# Patient Record
Sex: MALE | Race: WHITE | NOT HISPANIC OR LATINO | Employment: OTHER | URBAN - METROPOLITAN AREA
[De-identification: names, ages, dates, MRNs, and addresses within clinical notes are randomized per-mention and may not be internally consistent; named-entity substitution may affect disease eponyms.]

---

## 2017-03-31 ENCOUNTER — HOSPITAL ENCOUNTER (EMERGENCY)
Facility: CLINIC | Age: 77
Discharge: HOME | End: 2017-04-01
Attending: EMERGENCY MEDICINE | Admitting: EMERGENCY MEDICINE
Payer: MEDICARE

## 2017-03-31 DIAGNOSIS — K52.9: ICD-10-CM

## 2017-03-31 DIAGNOSIS — E87.6 HYPOPOTASSEMIA: ICD-10-CM

## 2017-03-31 PROCEDURE — 96361 HYDRATE IV INFUSION ADD-ON: CPT | Performed by: EMERGENCY MEDICINE

## 2017-03-31 PROCEDURE — 96360 HYDRATION IV INFUSION INIT: CPT | Performed by: EMERGENCY MEDICINE

## 2017-03-31 PROCEDURE — 99285 EMERGENCY DEPT VISIT HI MDM: CPT | Mod: 25 | Performed by: EMERGENCY MEDICINE

## 2017-03-31 PROCEDURE — 99284 EMERGENCY DEPT VISIT MOD MDM: CPT | Mod: Z6 | Performed by: EMERGENCY MEDICINE

## 2017-03-31 NOTE — ED AVS SNAPSHOT
" [facility], Emergency Department    [address] [facility] [address] [address]    Phone:  [phone]                                       Gregory Parnell   MRN: [id number]    Department:  [facility], Emergency Department   Date of Visit:  3/31/2017           Patient Information     Date Of Birth          1940        Your diagnoses for this visit were:     Enteritis, infectious, presumed        You were seen by Sampson Cruz MD.        Discharge Instructions       -Please make an appointment to follow up with Your Primary Care Provider in 2-3 days if not improving.  -May take imodium over the counter for diarrhea if persistent and more severe.  -Recommend monitoring for any return of or worsening pain, fevers, or nausea/vomiting that are uncontrolled and unable to keep any fluids down. If you are restarting oral intake back to normal diet recommend small amounts of food with increases to normal as tolerated.       * FOOD POISONING or VIRAL GASTROENTERITIS (6yr-Adult)  FOOD POISONING may occur from 6 to 24 hours after eating food that has spoiled and lasts up to1-2 days. VIRAL GASTRO-ENTERITIS is commonly known as the \"stomach flu\" and may last 2-7 days. Symptoms of both illnesses may include vomiting, diarrhea, fever, abdominal cramping. Antibiotics are not effective, but simple home treatment will be helpful.  HOME CARE:    If symptoms are severe, rest at home for the next 24 hours.    Avoid tobacco and alcohol. These may worsen your symptoms.    If medicines for diarrhea (low dose of Immodium: one tablet a day for an adult) or vomiting were prescribed, take only as directed.   During the first 12 to 24 hours follow the diet below:    DRINKS: Sport drinks like Gatorade, soft drinks without caffeine; ginger ale, mineral water (plain or flavored), decaffeinated tea and coffee.    SOUPS: Clear broth, consommé and bouillon    DESSERTS: Plain gelatin (Jell-O), popsicles and fruit juice " bars.  During the next 24 hours you may add the following to the above:    Hot cereal, plain toast, bread, rolls, crackers    Plain noodles, rice, mashed potatoes, chicken noodle or rice soup    Unsweetened canned fruit (avoid pineapple), bananas    Limit fat intake to less than 15 grams per day by avoiding margarine, butter, oils, mayonnaise, sauces, gravies, fried foods, peanut butter, meat, poultry and fish.    Limit fiber; avoid raw or cooked vegetables, fresh fruits (except bananas) and bran cereals.    Limit caffeine and chocolate. No spices or seasonings except salt.  Slowly go back to a normal diet as you feel better and your symptoms lessen.  FOLLOW UP with your doctor as advised if you are not better in 2 days. If a stool (diarrhea) sample was taken, you may call in 2 days (or as directed) for the results.  GET PROMPT MEDICAL ATTENTION if any of the following occur:    Increasing abdominal pain or constant pain in one spot    Continued vomiting (unable to keep liquids down)    Frequent diarrhea (more than 5 times a day)    Blood in vomit or stool (black or red color)    Unable to take in fluids at all    No urine output for 12 hours or extreme thirst    Weakness, dizziness, fainting    Drowsiness, confusion, stiff neck or seizure    Fever over 101.0  F (38.3  C) for more than 3 days    [employer] laura    8134-9364 [place] [address]. All rights reserved. This information is not intended as a substitute for professional medical care. Always follow your healthcare professional's instructions.      Treating Diarrhea  Diarrhea occurs when you have loose, watery, or frequent bowel movements. It is a common problem with many causes. Most cases of diarrhea clear up on their own. But certain cases may need treatment. Be sure to see your health care provider if your symptoms do not improve within a few days.    Getting Relief  Treatment of diarrhea depends on its cause. Diarrhea  caused by bacterial or parasite infection is often treated with antibiotics. Diarrhea caused by other factors, such as a stomach virus, often improves with simple home treatment. The tips below may also help relieve your symptoms.    Drink plenty of fluids. This helps prevent too much fluid loss (dehydration). Water, clear soups, and electrolyte solutions are good choices. Avoid alcohol, coffee, tea, and milk. These can make symptoms worse.    Suck on ice chips if drinking makes you queasy.    Return to your normal diet slowly. You may want to eat bland foods at first, such as rice and toast. Also, you may need to avoid certain foods for a while, such as dairy products. These can make symptoms worse. Ask your health care provider if there are any other foods you should avoid.    If you were prescribed antibiotics, take them as directed.    Do not take anti-diarrhea medications without asking your health care provider first.  Call Your Health Care Provider If You Have:    Fever of 102 F (38.0 C) or higher    Severe pain    Worsening diarrhea or diarrhea for more than 2 days    Bloody vomit or stool    Signs of dehydration (dizziness, dry mouth and tongue, rapid pulse, dark urine)    4644-8416 [employer]. [address] [place] [address]. All rights reserved. This information is not intended as a substitute for professional medical care. Always follow your healthcare professional's instructions.          24 Hour Appointment Hotline       To make an appointment at any [facility], call [phone] ([phone]). If you don't have a family doctor or clinic, we will help you find one. [place] clinics are conveniently located to serve the needs of you and your family.             Review of your medicines      START taking        Dose / Directions Last dose taken    ondansetron 4 MG ODT tab   Commonly known as:  ZOFRAN ODT   Dose:  4 mg   Quantity:  10 tablet        Take 1 tablet (4 mg)  by mouth every 8 hours as needed for nausea   Refills:  0                Prescriptions were sent or printed at these locations (1 Prescription)                   Other Prescriptions                Printed at Department/Unit printer (1 of 1)         ondansetron (ZOFRAN ODT) 4 MG ODT tab                Procedures and tests performed during your visit     CBC with platelets differential    CT Abdomen Pelvis w Contrast    Clostridium difficile toxin B PCR    Comprehensive metabolic panel    Enteric Bacteria and Virus Panel by DEVORAH Stool    Lipase    Occult blood stool    Peripheral IV: Standard    Pulse oximetry nursing      Orders Needing Specimen Collection     Ordered          04/01/17 0032  UA with Microscopic - STAT, [facility]: STAT, Needs to be Collected     Scheduled Task Status   04/01/17 0033 Collect UA with Microscopic Open   Order Class:  PCU Collect                  Pending Results     Date and Time Order Name Status Description    4/1/2017 0128 CT Abdomen Pelvis w Contrast Preliminary     4/1/2017 0032 Clostridium difficile toxin B PCR In process     4/1/2017 0032 Enteric Bacteria and Virus Panel by DEVORAH Stool In process     4/1/2017 0032 Occult blood stool In process             Pending Culture Results     Date and Time Order Name Status Description    4/1/2017 0032 Clostridium difficile toxin B PCR In process     4/1/2017 0032 Enteric Bacteria and Virus Panel by DEVORAH Stool In process             Thank you for choosing [facility]       Thank you for choosing [facility] for your care. Our goal is always to provide you with excellent care. Hearing back from our patients is one way we can continue to improve our services. Please take a few minutes to complete the written survey that you may receive in the mail after you visit with us. Thank you!        [employer] Information     [employer] lets you send messages to your doctor, view your test results, renew your prescriptions, schedule appointments and more. To sign up, go to  "[email or web address] . Click on \"Log in\" on the left side of the screen, which will take you to the Welcome page. Then click on \"Sign up Now\" on the right side of the page.     You will be asked to enter the access code listed below, as well as some personal information. Please follow the directions to create your username and password.     Your access code is: [email or web address]  Expires: 2017  4:14 AM     Your access code will  in 90 days. If you need help or a new code, please call your [facility] clinic or [phone].        Care EveryWhere ID     This is your Care EveryWhere ID. This could be used by other organizations to access your [facility] medical records  [id number]        After Visit Summary       This is your record. Keep this with you and show to your community pharmacist(s) and doctor(s) at your next visit.                  "

## 2017-03-31 NOTE — ED AVS SNAPSHOT
[facility], [place], Emergency Department    [address] [place] [address]    Phone:  [phone]                                       Gregory Parnell   MRN: [id number]    Department:  [facility], Emergency Department   Date of Visit:  3/31/2017           After Visit Summary Signature Page     I have received my discharge instructions, and my questions have been answered. I have discussed any challenges I see with this plan with the nurse or doctor.    ..........................................................................................................................................  Patient/Patient Representative Signature      ..........................................................................................................................................  Patient Representative Print Name and Relationship to Patient    ..................................................               ................................................  Date                                            Time    ..........................................................................................................................................  Reviewed by Signature/Title    ...................................................              ..............................................  Date                                                            Time

## 2017-04-01 ENCOUNTER — APPOINTMENT (OUTPATIENT)
Dept: CT IMAGING | Facility: CLINIC | Age: 77
End: 2017-04-01
Attending: EMERGENCY MEDICINE
Payer: MEDICARE

## 2017-04-01 ENCOUNTER — TELEPHONE (OUTPATIENT)
Dept: EMERGENCY MEDICINE | Facility: CLINIC | Age: 77
End: 2017-04-01

## 2017-04-01 VITALS
WEIGHT: 195 LBS | HEIGHT: 67 IN | OXYGEN SATURATION: 95 % | DIASTOLIC BLOOD PRESSURE: 80 MMHG | BODY MASS INDEX: 30.61 KG/M2 | HEART RATE: 56 BPM | RESPIRATION RATE: 18 BRPM | TEMPERATURE: 97.3 F | SYSTOLIC BLOOD PRESSURE: 126 MMHG

## 2017-04-01 LAB
ALBUMIN SERPL-MCNC: 3.2 G/DL (ref 3.4–5)
ALP SERPL-CCNC: 64 U/L (ref 40–150)
ALT SERPL W P-5'-P-CCNC: 15 U/L (ref 0–70)
ANION GAP SERPL CALCULATED.3IONS-SCNC: 12 MMOL/L (ref 3–14)
AST SERPL W P-5'-P-CCNC: 13 U/L (ref 0–45)
BASOPHILS # BLD AUTO: 0 10E9/L (ref 0–0.2)
BASOPHILS NFR BLD AUTO: 0.1 %
BILIRUB SERPL-MCNC: 0.5 MG/DL (ref 0.2–1.3)
BUN SERPL-MCNC: 21 MG/DL (ref 7–30)
C DIFF TOX B STL QL: NORMAL
CALCIUM SERPL-MCNC: 8.3 MG/DL (ref 8.5–10.1)
CAMPYLOBACTER GROUP BY NAT: ABNORMAL
CHLORIDE SERPL-SCNC: 108 MMOL/L (ref 94–109)
CO2 SERPL-SCNC: 24 MMOL/L (ref 20–32)
CREAT SERPL-MCNC: 0.77 MG/DL (ref 0.66–1.25)
DIFFERENTIAL METHOD BLD: ABNORMAL
ENTERIC PATHOGEN COMMENT: ABNORMAL
EOSINOPHIL # BLD AUTO: 0 10E9/L (ref 0–0.7)
EOSINOPHIL NFR BLD AUTO: 0.1 %
ERYTHROCYTE [DISTWIDTH] IN BLOOD BY AUTOMATED COUNT: 12.8 % (ref 10–15)
GFR SERPL CREATININE-BSD FRML MDRD: ABNORMAL ML/MIN/1.7M2
GLUCOSE SERPL-MCNC: 122 MG/DL (ref 70–99)
HCT VFR BLD AUTO: 42.9 % (ref 40–53)
HEMOCCULT STL QL: POSITIVE
HGB BLD-MCNC: 14.5 G/DL (ref 13.3–17.7)
IMM GRANULOCYTES # BLD: 0 10E9/L (ref 0–0.4)
IMM GRANULOCYTES NFR BLD: 0.1 %
LIPASE SERPL-CCNC: 53 U/L (ref 73–393)
LYMPHOCYTES # BLD AUTO: 0.7 10E9/L (ref 0.8–5.3)
LYMPHOCYTES NFR BLD AUTO: 9.8 %
MCH RBC QN AUTO: 33 PG (ref 26.5–33)
MCHC RBC AUTO-ENTMCNC: 33.8 G/DL (ref 31.5–36.5)
MCV RBC AUTO: 98 FL (ref 78–100)
MONOCYTES # BLD AUTO: 0.5 10E9/L (ref 0–1.3)
MONOCYTES NFR BLD AUTO: 6.8 %
NEUTROPHILS # BLD AUTO: 5.8 10E9/L (ref 1.6–8.3)
NEUTROPHILS NFR BLD AUTO: 83.1 %
NOROVIRUS I AND II BY NAT: ABNORMAL
NRBC # BLD AUTO: 0 10*3/UL
NRBC BLD AUTO-RTO: 0 /100
PLATELET # BLD AUTO: 148 10E9/L (ref 150–450)
POTASSIUM SERPL-SCNC: 3 MMOL/L (ref 3.4–5.3)
PROT SERPL-MCNC: 6.6 G/DL (ref 6.8–8.8)
RBC # BLD AUTO: 4.4 10E12/L (ref 4.4–5.9)
ROTAVIRUS A BY NAT: ABNORMAL
SALMONELLA SPECIES BY NAT: ABNORMAL
SHIGA TOXIN 1 GENE BY NAT: ABNORMAL
SHIGA TOXIN 2 GENE BY NAT: ABNORMAL
SHIGELLA SP+EIEC IPAH STL QL NAA+PROBE: ABNORMAL
SODIUM SERPL-SCNC: 143 MMOL/L (ref 133–144)
SPECIMEN SOURCE: NORMAL
VIBRIO GROUP BY NAT: ABNORMAL
WBC # BLD AUTO: 6.9 10E9/L (ref 4–11)
YERSINIA ENTEROCOLITICA BY NAT: ABNORMAL

## 2017-04-01 PROCEDURE — 83690 ASSAY OF LIPASE: CPT | Performed by: EMERGENCY MEDICINE

## 2017-04-01 PROCEDURE — 96360 HYDRATION IV INFUSION INIT: CPT | Performed by: EMERGENCY MEDICINE

## 2017-04-01 PROCEDURE — 96361 HYDRATE IV INFUSION ADD-ON: CPT | Performed by: EMERGENCY MEDICINE

## 2017-04-01 PROCEDURE — 82272 OCCULT BLD FECES 1-3 TESTS: CPT | Performed by: EMERGENCY MEDICINE

## 2017-04-01 PROCEDURE — 80053 COMPREHEN METABOLIC PANEL: CPT | Performed by: EMERGENCY MEDICINE

## 2017-04-01 PROCEDURE — 25500064 ZZH RX 255 OP 636: Performed by: EMERGENCY MEDICINE

## 2017-04-01 PROCEDURE — A9270 NON-COVERED ITEM OR SERVICE: HCPCS | Mod: GY | Performed by: EMERGENCY MEDICINE

## 2017-04-01 PROCEDURE — 87493 C DIFF AMPLIFIED PROBE: CPT | Performed by: EMERGENCY MEDICINE

## 2017-04-01 PROCEDURE — 25000128 H RX IP 250 OP 636: Performed by: EMERGENCY MEDICINE

## 2017-04-01 PROCEDURE — 74177 CT ABD & PELVIS W/CONTRAST: CPT

## 2017-04-01 PROCEDURE — 85025 COMPLETE CBC W/AUTO DIFF WBC: CPT | Performed by: EMERGENCY MEDICINE

## 2017-04-01 PROCEDURE — 25000132 ZZH RX MED GY IP 250 OP 250 PS 637: Mod: GY | Performed by: EMERGENCY MEDICINE

## 2017-04-01 RX ORDER — LIDOCAINE 40 MG/G
CREAM TOPICAL
Status: DISCONTINUED | OUTPATIENT
Start: 2017-04-01 | End: 2017-04-01 | Stop reason: HOSPADM

## 2017-04-01 RX ORDER — POTASSIUM CHLORIDE 750 MG/1
40 TABLET, EXTENDED RELEASE ORAL ONCE
Status: COMPLETED | OUTPATIENT
Start: 2017-04-01 | End: 2017-04-01

## 2017-04-01 RX ORDER — IOPAMIDOL 755 MG/ML
119 INJECTION, SOLUTION INTRAVASCULAR ONCE
Status: COMPLETED | OUTPATIENT
Start: 2017-04-01 | End: 2017-04-01

## 2017-04-01 RX ORDER — SODIUM CHLORIDE 9 MG/ML
1000 INJECTION, SOLUTION INTRAVENOUS CONTINUOUS
Status: DISCONTINUED | OUTPATIENT
Start: 2017-04-01 | End: 2017-04-01 | Stop reason: HOSPADM

## 2017-04-01 RX ORDER — ONDANSETRON 4 MG/1
4 TABLET, ORALLY DISINTEGRATING ORAL EVERY 8 HOURS PRN
Qty: 10 TABLET | Refills: 0 | Status: SHIPPED | OUTPATIENT
Start: 2017-04-01 | End: 2017-04-04

## 2017-04-01 RX ADMIN — POTASSIUM CHLORIDE 40 MEQ: 750 TABLET, EXTENDED RELEASE ORAL at 03:14

## 2017-04-01 RX ADMIN — IOPAMIDOL 119 ML: 755 INJECTION, SOLUTION INTRAVENOUS at 03:09

## 2017-04-01 RX ADMIN — SODIUM CHLORIDE 1000 ML: 9 INJECTION, SOLUTION INTRAVENOUS at 00:38

## 2017-04-01 RX ADMIN — SODIUM CHLORIDE 1000 ML: 9 INJECTION, SOLUTION INTRAVENOUS at 01:45

## 2017-04-01 ASSESSMENT — ENCOUNTER SYMPTOMS
DIARRHEA: 1
NAUSEA: 0
ABDOMINAL PAIN: 1

## 2017-04-01 NOTE — ED PROVIDER NOTES
"  History     Chief Complaint   Patient presents with     Abdominal Pain     Diarrhea     HPI  Gregory Parnell is a 76 year old male who presents to the ED with complaints of diffuse epigastric abdominal pain and diarrhea. Patient reports symptoms began yesterday morning. He states his wife had just gotten over the \"stomach flu\". He rates his diffuse epigastric abdominal pain 4-5 on a scale of 10. He denies current nausea. Patient reports he had an E.coli infection in his blood in 09/2016 with no stomach pain or diarrhea at that time.     PAST MEDICAL HISTORY  No past medical history on file.  PAST SURGICAL HISTORY  No past surgical history on file.  FAMILY HISTORY  No family history on file.  SOCIAL HISTORY  Social History   Substance Use Topics     Smoking status: Not on file     Smokeless tobacco: Not on file     Alcohol use Not on file     MEDICATIONS  No current facility-administered medications for this encounter.      Current Outpatient Prescriptions   Medication     ondansetron (ZOFRAN ODT) 4 MG ODT tab     ALLERGIES  No Known Allergies    I have reviewed the Medications, Allergies, Past Medical and Surgical History, and Social History in the Epic system.    Review of Systems   Gastrointestinal: Positive for abdominal pain (epigastric) and diarrhea. Negative for nausea.   All other systems reviewed and are negative.    Physical Exam   BP: 124/67  Pulse: 69  Temp: 97.3  F (36.3  C)  Height: 170.2 cm (5' 7\")  Weight: 88.5 kg (195 lb)  SpO2: 97 %  Physical Exam   Constitutional: He appears well-developed and well-nourished. No distress.   HENT:   Head: Normocephalic and atraumatic.   Cardiovascular: Normal rate, regular rhythm and normal heart sounds.    Pulmonary/Chest: Effort normal and breath sounds normal. No respiratory distress. He has no wheezes.   Abdominal: Soft. Bowel sounds are normal. He exhibits no distension. There is no tenderness. There is no guarding.   Musculoskeletal: Normal range of motion. " He exhibits no edema.   Neurological: He is alert.   Skin: Skin is warm and dry. He is not diaphoretic. No pallor.   Psychiatric: He has a normal mood and affect.   Nursing note and vitals reviewed.    ED Course     ED Course     Procedures   12:21 AM  The patient was seen and examined by Dr. Cruz in Room 18.           Critical Care time:  none        Labs Ordered and Resulted from Time of ED Arrival Up to the Time of Departure from the ED   CBC WITH PLATELETS DIFFERENTIAL - Abnormal; Notable for the following:        Result Value    Platelet Count 148 (*)     Absolute Lymphocytes 0.7 (*)     All other components within normal limits   COMPREHENSIVE METABOLIC PANEL - Abnormal; Notable for the following:     Potassium 3.0 (*)     Glucose 122 (*)     Calcium 8.3 (*)     Albumin 3.2 (*)     Protein Total 6.6 (*)     All other components within normal limits   LIPASE - Abnormal; Notable for the following:     Lipase 53 (*)     All other components within normal limits   PULSE OXIMETRY NURSING   PERIPHERAL IV CATHETER   ENTERIC BACTERIA AND VIRUS PANEL BY DEVORAH STOOL   CLOSTRIDIUM DIFFICILE TOXIN B     Ct Abdomen Pelvis W Contrast    Result Date: 4/1/2017  EXAMINATION: CT ABDOMEN PELVIS W CONTRAST, 4/1/2017 3:25 AM TECHNIQUE:  Helical CT images from the lung bases through the symphysis pubis were obtained with IV contrast. Contrast dose: iopamidol (ISOVUE-370) solution 119 mL   COMPARISON: None HISTORY: eval for trans colon diverticulitis or colitis. FINDINGS: Lower chest: Slightly prominent mediastinal and left hilar lymph nodes are not enlarged by size criteria. Dependent atelectasis. 2 -3 mm nodular opacities in the right minor fissure, likely fissural lymph nodes. Bones and soft tissues: Old right lower rib fractures. Degenerative changes lumbar spine. Abdomen and pelvis: Colonic diverticulosis. No diverticulitis. Normal liver, gallbladder, spleen, pancreas, kidneys, urinary bladder. Mild thickening of bilateral  adrenal glands, which may be due to hyperplasia or adenomatous change. Prostate radiotherapy marker clips. Colonic diverticulosis, no diverticulitis. Appendix not reliably seen. No pericecal fat stranding. Dilatation of mid and distal small bowel loops with diameter up to 4.1 cm. No transition point. Slightly increased enhancement of the distal small bowel walls containing fluid. Minimal mesenteric fluid. Few peripancreatic and periportal lymph nodes largest in portal caval location on series 2 image 63 with short axis of 12 mm which is within normal limits for this location. Periampullary diverticulum. Fluid noted within the large bowel.     IMPRESSION:  Mild dilatation of the mid small bowel loops, with fluid distended distal small bowel loops, with fluid noted within the large bowel. Mild hyperenhancement of the distal small bowel wall. Findings are most consistent with enteritis, unlikely to be small bowel obstruction. No focal transverse colon diverticulitis or colitis as clinically questioned. I have personally reviewed the examination and initial interpretation and I agree with the findings. SUSI AGOSTO    Assessments & Plan (with Medical Decision Making)   I was physically present and have reviewed and verified the accuracy of this note documented by (myself).     Disclaimer: This note consists of symbols derived from keyboarding, dictation, and/or voice recognition software. As a result, there may be errors in the script that have gone undetected.  Please consider this when interpreting information found in the chart.These sections of the chart were reviewed for accuracy to the best of my knowledge and ability.    Patient was clinically assessed and consented to treatment. After assessment, medical decision making and workup were discussed with the patient. The patient was agreeable to plan for testing, workup, and treatment.  Gregory Parnell is a 76 year old male who presents today for diarrhea with  abdominal discomfort.  Patient without any abdominal tenderness on exam however with the diarrhea patient could be concerning for colitis, diverticulitis, enteritis, gastroparesis, gastritis, pancreatitis.  IV was established and labs were sent.  Stool cultures were also sent.  Patient did not have leukocytosis and hemoglobin was stable.  Comprehensive metabolic panel showed slightly low potassium which will be repleted.  No findings of any biliary elevation or disease.  Lipase was 53 and unlikely pancreatitis.  Given the patient's reported tenderness in the abdomen he will be sent for CT scan to evaluate for diverticulitis or colitis.  CT scan showed dilation of small bowel loops which could be consistent with enteritis more so than obstruction.  He did have air going all the way through the rectum and I agree this is likely enteritis as opposed to small bowel obstruction especially in light of fact the patient has had 3 diarrheal bowel movements since arrival in the ER.  These were sent for culture which are pending at this time and patient was feeling well after some IV fluids and Zofran.  She will be discharged home with supportive care instructions and recommendations to follow-up with his cultures to probably have to start antibiotics or he will be contacted from the ER.  Patient was called with this plan and will be discharged home at this time.    I have reviewed the nursing notes.    I have reviewed the findings, diagnosis, plan and need for follow up with the patient.    Discharge Medication List as of 4/1/2017  4:14 AM      START taking these medications    Details   ondansetron (ZOFRAN ODT) 4 MG ODT tab Take 1 tablet (4 mg) by mouth every 8 hours as needed for nausea, Disp-10 tablet, R-0, Local Print             Final diagnoses:   Enteritis, infectious, presumed     I, Henry Ortega am serving as a trained medical scribe to document services personally performed by Sampson Cruz MD, based on the  provider's statements to me.      I, Sampson Cruz MD, was physically present and have reviewed and verified the accuracy of this note documented by Henry Ortega.      3/31/2017   [facility], [place], EMERGENCY DEPARTMENT     Sampson Cruz MD  04/01/17 0844

## 2017-04-01 NOTE — DISCHARGE INSTRUCTIONS
"-Please make an appointment to follow up with Your Primary Care Provider in 2-3 days if not improving.  -May take imodium over the counter for diarrhea if persistent and more severe.  -Recommend monitoring for any return of or worsening pain, fevers, or nausea/vomiting that are uncontrolled and unable to keep any fluids down. If you are restarting oral intake back to normal diet recommend small amounts of food with increases to normal as tolerated.       * FOOD POISONING or VIRAL GASTROENTERITIS (6yr-Adult)  FOOD POISONING may occur from 6 to 24 hours after eating food that has spoiled and lasts up to1-2 days. VIRAL GASTRO-ENTERITIS is commonly known as the \"stomach flu\" and may last 2-7 days. Symptoms of both illnesses may include vomiting, diarrhea, fever, abdominal cramping. Antibiotics are not effective, but simple home treatment will be helpful.  HOME CARE:    If symptoms are severe, rest at home for the next 24 hours.    Avoid tobacco and alcohol. These may worsen your symptoms.    If medicines for diarrhea (low dose of Immodium: one tablet a day for an adult) or vomiting were prescribed, take only as directed.   During the first 12 to 24 hours follow the diet below:    DRINKS: Sport drinks like Gatorade, soft drinks without caffeine; ginger ale, mineral water (plain or flavored), decaffeinated tea and coffee.    SOUPS: Clear broth, consommé and bouillon    DESSERTS: Plain gelatin (Jell-O), popsicles and fruit juice bars.  During the next 24 hours you may add the following to the above:    Hot cereal, plain toast, bread, rolls, crackers    Plain noodles, rice, mashed potatoes, chicken noodle or rice soup    Unsweetened canned fruit (avoid pineapple), bananas    Limit fat intake to less than 15 grams per day by avoiding margarine, butter, oils, mayonnaise, sauces, gravies, fried foods, peanut butter, meat, poultry and fish.    Limit fiber; avoid raw or cooked vegetables, fresh fruits (except bananas) and bran " cereals.    Limit caffeine and chocolate. No spices or seasonings except salt.  Slowly go back to a normal diet as you feel better and your symptoms lessen.  FOLLOW UP with your doctor as advised if you are not better in 2 days. If a stool (diarrhea) sample was taken, you may call in 2 days (or as directed) for the results.  GET PROMPT MEDICAL ATTENTION if any of the following occur:    Increasing abdominal pain or constant pain in one spot    Continued vomiting (unable to keep liquids down)    Frequent diarrhea (more than 5 times a day)    Blood in vomit or stool (black or red color)    Unable to take in fluids at all    No urine output for 12 hours or extreme thirst    Weakness, dizziness, fainting    Drowsiness, confusion, stiff neck or seizure    Fever over 101.0  F (38.3  C) for more than 3 days    New rash    1982-6929 [employer] [facility], [address] [place] [address]. All rights reserved. This information is not intended as a substitute for professional medical care. Always follow your healthcare professional's instructions.      Treating Diarrhea  Diarrhea occurs when you have loose, watery, or frequent bowel movements. It is a common problem with many causes. Most cases of diarrhea clear up on their own. But certain cases may need treatment. Be sure to see your health care provider if your symptoms do not improve within a few days.    Getting Relief  Treatment of diarrhea depends on its cause. Diarrhea caused by bacterial or parasite infection is often treated with antibiotics. Diarrhea caused by other factors, such as a stomach virus, often improves with simple home treatment. The tips below may also help relieve your symptoms.    Drink plenty of fluids. This helps prevent too much fluid loss (dehydration). Water, clear soups, and electrolyte solutions are good choices. Avoid alcohol, coffee, tea, and milk. These can make symptoms worse.    Suck on ice chips if drinking makes you queasy.    Return  to your normal diet slowly. You may want to eat bland foods at first, such as rice and toast. Also, you may need to avoid certain foods for a while, such as dairy products. These can make symptoms worse. Ask your health care provider if there are any other foods you should avoid.    If you were prescribed antibiotics, take them as directed.    Do not take anti-diarrhea medications without asking your health care provider first.  Call Your Health Care Provider If You Have:    Fever of 102 F (38.0 C) or higher    Severe pain    Worsening diarrhea or diarrhea for more than 2 days    Bloody vomit or stool    Signs of dehydration (dizziness, dry mouth and tongue, rapid pulse, dark urine)    3171-6378 [employer] [address] [place] [address], [place] [address]. All rights reserved. This information is not intended as a substitute for professional medical care. Always follow your healthcare professional's instructions.

## 2017-04-01 NOTE — TELEPHONE ENCOUNTER
[facility] Emergency Department Lab result notification:    Reason for call  Notify of lab results, assess symptoms,  review ED providers recommendations (if necessary) and advise per ED lab result f/u protocol.    Lab result  C diff negative / Enteric bacteria and virus panel specimen not properly collected, unable to run test.  Advised if symptoms continue, to be seen again with PCP or in a different ED.  Currently feeling much improved and is driving back home to [place].  Wife verbalizes understanding.       Shadia Lyle RN    [facility] RN  Lung Nodule and ED Lab Results F/U RN  Epic pool (ED late result f/u RN) : [id number]   # [phone]

## 2017-04-01 NOTE — ED NOTES
"Patient arrived to the ED with persistent stomach pain. He is just getting over the \"stomach flu\" and has a history of e.coli infection. He is alert, oriented, and ambulatory.   "

## 2023-11-28 ENCOUNTER — TRANSFERRED RECORDS (OUTPATIENT)
Dept: HEALTH INFORMATION MANAGEMENT | Facility: CLINIC | Age: 83
End: 2023-11-28
Payer: MEDICARE

## 2023-11-29 ENCOUNTER — TRANSCRIBE ORDERS (OUTPATIENT)
Dept: OTHER | Age: 83
End: 2023-11-29

## 2023-11-29 DIAGNOSIS — H02.88B MEIBOMIAN GLAND DYSFUNCTION LEFT EYE, UPPER AND LOWER EYELIDS: ICD-10-CM

## 2023-11-29 DIAGNOSIS — H16.222 KERATOCONJUNCTIVITIS SICCA, NOT SPECIFIED AS SJOGREN'S, LEFT EYE: ICD-10-CM

## 2023-11-29 DIAGNOSIS — H02.423 MYOGENIC PTOSIS OF BILATERAL EYELIDS: Primary | ICD-10-CM

## 2023-11-29 DIAGNOSIS — H02.88A MEIBOMIAN GLAND DYSFUNCTION RIGHT EYE, UPPER AND LOWER EYELIDS: ICD-10-CM

## 2023-11-29 NOTE — TELEPHONE ENCOUNTER
FUTURE VISIT INFORMATION      FUTURE VISIT INFORMATION:  Date: 2/26/2024  Time: 8 AM  Location: [facility]  REFERRAL INFORMATION:  Referring provider: Dr. Joey Merchant  Referring providers clinic:  [facility]  Reason for visit/diagnosis: Ptosis    RECORDS REQUESTED FROM:       Clinic name Comments Records Status Imaging Status   [facility] [place] 11/28/23 - EYE OV and referral with Dr. Merchant Scanned In    [facility] 10/24/23 - ENT OV with Oxana Bishop NP Care Everywhere

## 2024-02-22 ENCOUNTER — TELEPHONE (OUTPATIENT)
Dept: OPHTHALMOLOGY | Facility: CLINIC | Age: 84
End: 2024-02-22
Payer: MEDICARE

## 2024-02-26 ENCOUNTER — OFFICE VISIT (OUTPATIENT)
Dept: OPHTHALMOLOGY | Facility: CLINIC | Age: 84
End: 2024-02-26
Payer: MEDICARE

## 2024-02-26 ENCOUNTER — PRE VISIT (OUTPATIENT)
Dept: OPHTHALMOLOGY | Facility: CLINIC | Age: 84
End: 2024-02-26

## 2024-02-26 DIAGNOSIS — H02.403 PTOSIS OF BOTH EYELIDS: Primary | ICD-10-CM

## 2024-02-26 PROCEDURE — 92082 INTERMEDIATE VISUAL FIELD XM: CPT | Mod: GC | Performed by: OPHTHALMOLOGY

## 2024-02-26 PROCEDURE — 99204 OFFICE O/P NEW MOD 45 MIN: CPT | Mod: GC | Performed by: OPHTHALMOLOGY

## 2024-02-26 PROCEDURE — 92285 EXTERNAL OCULAR PHOTOGRAPHY: CPT | Mod: GC | Performed by: OPHTHALMOLOGY

## 2024-02-26 RX ORDER — TAMSULOSIN HYDROCHLORIDE 0.4 MG/1
0.4 CAPSULE ORAL
COMMUNITY
Start: 2023-09-15

## 2024-02-26 RX ORDER — NALTREXONE HYDROCHLORIDE 50 MG/1
50 TABLET, FILM COATED ORAL
COMMUNITY
Start: 2023-07-11

## 2024-02-26 RX ORDER — LOSARTAN POTASSIUM 50 MG/1
1 TABLET ORAL DAILY
COMMUNITY
Start: 2023-07-11

## 2024-02-26 RX ORDER — HYDROCHLOROTHIAZIDE 50 MG/1
25 TABLET ORAL
COMMUNITY
Start: 2023-07-11

## 2024-02-26 RX ORDER — SIMVASTATIN 80 MG
40 TABLET ORAL
COMMUNITY
Start: 2022-07-05

## 2024-02-26 RX ORDER — FLUTICASONE PROPIONATE AND SALMETEROL 250; 50 UG/1; UG/1
POWDER RESPIRATORY (INHALATION)
COMMUNITY
Start: 2023-07-11

## 2024-02-26 ASSESSMENT — VISUAL ACUITY
OS_PH_SC+: +2
OD_SC: 20/30
METHOD: SNELLEN - LINEAR
OS_PH_SC: 20/40
OD_SC+: -2
OS_SC: 20/50

## 2024-02-26 ASSESSMENT — TONOMETRY
OD_IOP_MMHG: 6
IOP_METHOD: ICARE
OS_IOP_MMHG: 6

## 2024-02-26 ASSESSMENT — LEVATOR FUNCTION
OS_LEVATOR: 12
OD_LEVATOR: 12

## 2024-02-26 ASSESSMENT — CONF VISUAL FIELD
OD_SUPERIOR_NASAL_RESTRICTION: 3
METHOD: COUNTING FINGERS
OS_SUPERIOR_NASAL_RESTRICTION: 3

## 2024-02-26 ASSESSMENT — EXTERNAL EXAM - LEFT EYE: OS_EXAM: NORMAL

## 2024-02-26 ASSESSMENT — EXTERNAL EXAM - RIGHT EYE: OD_EXAM: NORMAL

## 2024-02-26 ASSESSMENT — MARGIN REFLEX DISTANCE
OS_MRD1: 0
OD_MRD1: 0

## 2024-02-26 NOTE — PATIENT INSTRUCTIONS
"Ptosis (Drooping Eyelids)    Eyelid ptosis (pronounced \"nat-sis\") is a condition in which the upper eyelid droops or sags. It can affect one or both eyes. Sometimes the eyelid droops enough to obstruct the upper field of vision and/or side vision, requiring correction. Ptosis Repair is a surgical procedure that can correct drooping eyelid(s). Depending upon the degree and cause, repair involves either resection (shortening) of a muscle in the eyelid or suspension with a muscle of the brow. Typically, the levator muscle (the major muscle responsible for elevating the upper eyelid) is shortened though an incision made along the natural crease of the lid. Excess skin weighing down the eyelid may also be removed.     Congenital Ptosis  Present from birth, the most common cause of congenital ptosis is the improper development of the levator muscle. Children may need tilt their head back or lift their eyelid with a finger to see. They may also develop amblyopia (\"lazy eye\"), strabismus (eyes that are not properly aligned), astigmatism, or blurred vision. Repair for mild to moderate congenital ptosis is generally performed between ages 3 and 5. Severe visual obstruction may require earlier treatment. Repair is usually performed in an outpatient surgical facility under general anesthesia so the child will not become anxious or restless during the procedure.     Acquired Ptosis  Most commonly due to age-related weakening of the levator muscle, acquired ptosis may also be caused by injury, trauma, or procedures, such as cataract surgery, which can cause weak tendons to stretch. Acquired ptosis may also be the first sign of some diseases, such as myasthenia gravis (a disorder in which the muscles become weak), or Vasquez's syndrome (a neurological condition that indicates injury to part of the sympathetic nervous system). Ptosis Repair is usually performed in an outpatient surgical facility under anesthesia that induces a " "\"twilight\" state. Sedated consciousness is preferred so that Dr. Sarmiento can accurately adjust the eyelids.     Who Should Perform The Surgery?   When choosing a surgeon to perform ptosis surgery, look for a cosmetic and reconstructive surgeon who specializes in the eyelids, orbit, and tear drain system. Dr. Sarmiento's membership in the American Society of Ophthalmic Plastic and Reconstructive Surgery (ASOPRS) indicates he or she is not only a board certified ophthalmologist who knows the anatomy and structure of the eyelids and orbit, but also has had extensive training in ophthalmic plastic reconstructive and cosmetic surgery.   "

## 2024-02-26 NOTE — PROGRESS NOTES
Chief Complaints and History of Present Illnesses   Patient presents with    Droopy Eye Lid Evaluation     Chief Complaint(s) and History of Present Illness(es)     Droopy Eye Lid Evaluation    In right upper lid and left upper lid.  Severity is moderate.  Disease is   chronic.  Since onset it is gradually worsening.  Associated signs and   symptoms include Negative for eye pain and eyelid swelling.  Response to   treatment was no improvement.  Pain was noted as 0/10.    Comments    Gregory Parnell is being seen for a consult today by the request of Dr. Joey Merchant for ptosis bilateral upper eyelids. Patient has a history of   two previous eyelid surgeries, with the last surgery approximately 4-5   years ago.  It did not seem to help much because he continues to have   ptosis that is impacting his vision.  Has been using autologous serum   tears to help with dry eyes as was told that further lid surgery may   worsen his dry eyes.  Does not notice worsening throughout the day.  He   has been using upneeq and notices improvement in his lids.      Angelia Antunez on 2/26/2024 at 8:11 AM       FUNCTIONAL COMPLAINTS RELATED TO DROOPY EYELIDS/BROWS:  Gregory Parnell describes upper lids interfering with superior visual field and interfering with activities of daily living including reading, driving and watching television.     EXAM:   Dominant eye left eye    MRD1: Right eye 0.5 mm   Left eye 0.5 mm    VISUAL FIELD:  Right eye untaped:0 degrees Right eye taped:38 degrees  Left eye untaped:2 degrees Left eye taped:35 degrees    Right eye visual field improves by: 38 degrees  Left eye visual field improves by: 33 degrees    Assessment & Plan     Gregory Parnell is a 83 year old male with the following diagnoses:   1. Ptosis of both eyelids      PMH: HTN; no aspirin or pacemaker  POH: Dry eye, MGD, CEIOL each eye, eyelid surgery x2 in [place] (most recent 5 years ago)    Bilateral upper lids ptosis repair with levator  approach, lid crease incision (no skin removal)           Lilian Baptiste MD  Oculoplastic Surgery Fellow    Attending Physician Attestation:  I have seen and examined this patient with the fellow .  I have confirmed and edited as necessary the chief complaint(s), history of present illness, review of systems, relevant history, and examination findings as documented by others.  I have personally reviewed the relevant tests, images, and reports as documented above.  I have confirmed and edited as necessary the assessment and plan and agree with this note.    - Lior Sarmiento MD 9:22 AM 2/26/2024     Today with Gregory Parnell, I reviewed the indications, risks, benefits, and alternatives of the proposed surgical procedure including, but not limited to, failure obtain the desired result  and need for additional surgery, bleeding, infection, loss of vision, loss of the eye, and the remote possibility of permanent damage to any organ system or death with the use of anesthesia.  I provided multiple opportunities for the questions, answered all questions to the best of my ability, and confirmed that my answers and my discussion were understood.     - Lior Sarmiento MD 9:23 AM 2/26/2024     [Change in Activity] : change in activity [Joint Pains] : arthralgias [Muscle Aches] : muscle aches [Rash] : no rash [Nasal Stuffiness] : no nasal congestion [Wheezing] : no wheezing [Cough] : no cough [Limping] : no limping

## 2024-02-26 NOTE — LETTER
2024         RE:  :  MRN: Gregory Parnell  1940  [id number]     Dear Dr. Merchant,    Thank you for asking me to see your patient, Gregory Parnell, for an oculoplastic   consultation.  My assessment and plan are below.  For further details, please see my attached clinic note.          Assessment & Plan     Gregory Parnell is a 83 year old male with the following diagnoses:   1. Ptosis of both eyelids      PMH: HTN; no aspirin or pacemaker  POH: Dry eye, MGD, CEIOL each eye, eyelid surgery x2 in [place] (most recent 5 years ago)    Bilateral upper lids ptosis repair with levator approach, lid crease incision (no skin removal)         Again, thank you for allowing me to participate in the care of your patient.      Sincerely,    Lior Sarmiento MD  Department of Ophthalmology and Visual Neurosciences  [facility]    CC: MD [facility]  [address] [place] [address] [address] [place] [address]  Via Fax: [phone]

## 2024-02-26 NOTE — NURSING NOTE
Chief Complaints and History of Present Illnesses   Patient presents with    Droopy Eye Lid Evaluation     Chief Complaint(s) and History of Present Illness(es)       Droopy Eye Lid Evaluation              Laterality: right upper lid and left upper lid    Severity: moderate    Onset: chronic    Course: gradually worsening    Associated signs and symptoms: Negative for eye pain and eyelid swelling    Response to treatment: no improvement    Pain scale: 0/10              Comments    Gregory Parnell is being seen for a consult today by the request of Dr. Joey Merchant for ptosis bilateral upper eyelids. Patient has a history of two previous eyelid surgeries, with the last surgery approximately 4-5 years ago.  It did not seem to help much because he continues to have ptosis that is impacting his vision.  Has been using autologous serum tears to help with dry eyes as was told that further lid surgery may worsen his dry eyes.  Does not notice worsening throughout the day.  He has been using upneeq and notices improvement in his lids.      Angelia Antunez on 2/26/2024 at 8:11 AM

## 2024-02-28 ENCOUNTER — TELEPHONE (OUTPATIENT)
Dept: OPHTHALMOLOGY | Facility: CLINIC | Age: 84
End: 2024-02-28
Payer: MEDICARE

## 2024-02-28 PROBLEM — H02.403 PTOSIS OF BOTH EYELIDS: Status: ACTIVE | Noted: 2024-02-26

## 2024-02-28 NOTE — TELEPHONE ENCOUNTER
Patient is schedule for surgery with: Dr. Sarmiento    Surgery Date: 5/1     Location: [facility]    H&P: to be completed by Primary Care team - patient instructed to schedule per patient, this will be scheduled with [facility]     Post-op:  5/13, telephone visit     Patient will receive a phone call from pre-admission nurses 1-2 days prior to surgery with arrival time and NPO instructions.    Patient aware times are subject to change up until day before surgery.     Patient questions/concerns: N/A     Surgery packet was sent via US mail on  2/28      Autumn Lopes on 2/28/2024 at 11:01 AM

## 2024-04-15 NOTE — TELEPHONE ENCOUNTER
Spoke with patient, he made his appointment for his pre op today. It is scheduled tomorrow 4/16 [facility], fax number given to send forms [phone]. He was wanting arrival times, gave patient [facility] arrival time of 950AM and informed him this time could change if patients ahead of him CX, he expressed understanding and will reach out if he has any questions     Anna C. Schoenecker on 4/15/2024 at 1:31 PM

## 2024-04-28 ENCOUNTER — ANESTHESIA EVENT (OUTPATIENT)
Dept: SURGERY | Facility: AMBULATORY SURGERY CENTER | Age: 84
End: 2024-04-28
Payer: MEDICARE

## 2024-04-30 NOTE — ANESTHESIA PREPROCEDURE EVALUATION
Anesthesia Pre-Procedure Evaluation    Patient: Gregory Parnell   MRN: [id number] : 1940        Procedure : Procedure(s):  REPAIR, PTOSIS, BILATERAL          Past Medical History:   Diagnosis Date    BPH (benign prostatic hyperplasia)     Hypertension     Mixed hyperlipidemia       Past Surgical History:   Procedure Laterality Date    CATARACT IOL, RT/LT      REPAIR PTOSIS        No Known Allergies   Social History     Tobacco Use    Smoking status: Never    Smokeless tobacco: Never   Substance Use Topics    Alcohol use: Not on file      Wt Readings from Last 1 Encounters:   17 88.5 kg (195 lb)        Anesthesia Evaluation   Pt has had prior anesthetic.         ROS/MED HX  ENT/Pulmonary:     (+) sleep apnea, uses CPAP,                                      Neurologic:  - neg neurologic ROS     Cardiovascular:     (+)  hypertension- -   -  - -                                 Previous cardiac testing   Echo: Date: Results:    Stress Test:  Date:  Results:  Interpretation Summary   1. No echocardiographic changes to support myocardial ischemia or infarct.   2. Normal resting baseline left ventricular systolic function. Estimated EF 60-65%.   3. No EKG changes diagnostic for ischemia with exercise testing.   4. Hernandez treadmill score of 4 indicative of moderate annual risk for major adverse cardiac events.     ECG Reviewed:  Date: Results:    Cath:  Date: Results:      METS/Exercise Tolerance: 3 - Able to walk 1-2 blocks without stopping    Hematologic: Comments: MGUS      Musculoskeletal:  - neg musculoskeletal ROS     GI/Hepatic:  - neg GI/hepatic ROS     Renal/Genitourinary:  - neg Renal ROS     Endo:  - neg endo ROS     Psychiatric/Substance Use: Comment: On Naltrexone 50 mg daily ( last dose 24)    (+) psychiatric history depression alcohol abuse      Infectious Disease:  - neg infectious disease ROS     Malignancy:  - neg malignancy ROS     Other:            Physical Exam    Airway       "  Mallampati: II   TM distance: > 3 FB   Neck ROM: full   Mouth opening: > 3 cm    Respiratory Devices and Support         Dental       (+) Minor Abnormalities - some fillings, tiny chips      Cardiovascular   cardiovascular exam normal          Pulmonary   pulmonary exam normal                OUTSIDE LABS:  CBC:   Lab Results   Component Value Date    WBC 6.9 04/01/2017    HGB 14.5 04/01/2017    HCT 42.9 04/01/2017     (L) 04/01/2017     BMP:   Lab Results   Component Value Date     04/01/2017    POTASSIUM 3.0 (L) 04/01/2017    CHLORIDE 108 04/01/2017    CO2 24 04/01/2017    BUN 21 04/01/2017    CR 0.77 04/01/2017     (H) 04/01/2017     COAGS: No results found for: \"PTT\", \"INR\", \"FIBR\"  POC: No results found for: \"BGM\", \"HCG\", \"HCGS\"  HEPATIC:   Lab Results   Component Value Date    ALBUMIN 3.2 (L) 04/01/2017    PROTTOTAL 6.6 (L) 04/01/2017    ALT 15 04/01/2017    AST 13 04/01/2017    ALKPHOS 64 04/01/2017    BILITOTAL 0.5 04/01/2017     OTHER:   Lab Results   Component Value Date    SEBASTIAN 8.3 (L) 04/01/2017    LIPASE 53 (L) 04/01/2017       Anesthesia Plan    ASA Status:  2    NPO Status:  NPO Appropriate    Anesthesia Type: MAC.     - Reason for MAC: straight local not clinically adequate   Induction: Intravenous, Propofol.   Maintenance: TIVA.        Consents    Anesthesia Plan(s) and associated risks, benefits, and realistic alternatives discussed. Questions answered and patient/representative(s) expressed understanding.     - Discussed: Risks, Benefits and Alternatives for BOTH SEDATION and the PROCEDURE were discussed     - Discussed with:  Patient      - Extended Intubation/Ventilatory Support Discussed: No.      - Patient is DNR/DNI Status: No     Use of blood products discussed: No .     Postoperative Care    Pain management: IV analgesics, Oral pain medications, Multi-modal analgesia.   PONV prophylaxis: Dexamethasone or Solumedrol, Ondansetron (or other 5HT-3), Background Propofol " Infusion     Comments:               Uziel Cui MD    I have reviewed the pertinent notes and labs in the chart from the past 30 days and (re)examined the patient.  Any updates or changes from those notes are reflected in this note.

## 2024-05-01 ENCOUNTER — HOSPITAL ENCOUNTER (OUTPATIENT)
Facility: AMBULATORY SURGERY CENTER | Age: 84
Discharge: HOME | End: 2024-05-01
Attending: OPHTHALMOLOGY
Payer: MEDICARE

## 2024-05-01 ENCOUNTER — ANESTHESIA (OUTPATIENT)
Dept: SURGERY | Facility: AMBULATORY SURGERY CENTER | Age: 84
End: 2024-05-01
Payer: MEDICARE

## 2024-05-01 VITALS
SYSTOLIC BLOOD PRESSURE: 135 MMHG | OXYGEN SATURATION: 96 % | WEIGHT: 205 LBS | DIASTOLIC BLOOD PRESSURE: 64 MMHG | BODY MASS INDEX: 32.18 KG/M2 | RESPIRATION RATE: 16 BRPM | HEIGHT: 67 IN | TEMPERATURE: 98.5 F

## 2024-05-01 DIAGNOSIS — Z98.890 POSTOPERATIVE EYE STATE: Primary | ICD-10-CM

## 2024-05-01 PROCEDURE — 99100 ANES PT EXTEME AGE<1 YR&>70: CPT | Performed by: STUDENT IN AN ORGANIZED HEALTH CARE EDUCATION/TRAINING PROGRAM

## 2024-05-01 PROCEDURE — 67900 REPAIR BROW DEFECT: CPT | Performed by: NURSE ANESTHETIST, CERTIFIED REGISTERED

## 2024-05-01 PROCEDURE — 99100 ANES PT EXTEME AGE<1 YR&>70: CPT | Performed by: NURSE ANESTHETIST, CERTIFIED REGISTERED

## 2024-05-01 PROCEDURE — 67904 REPAIR EYELID DEFECT: CPT | Mod: 50 | Performed by: OPHTHALMOLOGY

## 2024-05-01 PROCEDURE — 67904 REPAIR EYELID DEFECT: CPT | Mod: RT

## 2024-05-01 PROCEDURE — 67900 REPAIR BROW DEFECT: CPT | Performed by: STUDENT IN AN ORGANIZED HEALTH CARE EDUCATION/TRAINING PROGRAM

## 2024-05-01 RX ORDER — ACETAMINOPHEN 325 MG/1
975 TABLET ORAL ONCE
Status: COMPLETED | OUTPATIENT
Start: 2024-05-01 | End: 2024-05-01

## 2024-05-01 RX ORDER — ONDANSETRON 2 MG/ML
4 INJECTION INTRAMUSCULAR; INTRAVENOUS EVERY 30 MIN PRN
Status: DISCONTINUED | OUTPATIENT
Start: 2024-05-01 | End: 2024-05-02 | Stop reason: HOSPADM

## 2024-05-01 RX ORDER — SODIUM CHLORIDE, SODIUM LACTATE, POTASSIUM CHLORIDE, CALCIUM CHLORIDE 600; 310; 30; 20 MG/100ML; MG/100ML; MG/100ML; MG/100ML
INJECTION, SOLUTION INTRAVENOUS CONTINUOUS
Status: DISCONTINUED | OUTPATIENT
Start: 2024-05-01 | End: 2024-05-02 | Stop reason: HOSPADM

## 2024-05-01 RX ORDER — ONDANSETRON 4 MG/1
4 TABLET, ORALLY DISINTEGRATING ORAL EVERY 30 MIN PRN
Status: DISCONTINUED | OUTPATIENT
Start: 2024-05-01 | End: 2024-05-02 | Stop reason: HOSPADM

## 2024-05-01 RX ORDER — FENTANYL CITRATE 50 UG/ML
25 INJECTION, SOLUTION INTRAMUSCULAR; INTRAVENOUS EVERY 5 MIN PRN
Status: DISCONTINUED | OUTPATIENT
Start: 2024-05-01 | End: 2024-05-02 | Stop reason: HOSPADM

## 2024-05-01 RX ORDER — OXYCODONE HYDROCHLORIDE 5 MG/1
5 TABLET ORAL EVERY 6 HOURS PRN
Qty: 12 TABLET | Refills: 0 | Status: SHIPPED | OUTPATIENT
Start: 2024-05-01 | End: 2024-05-04

## 2024-05-01 RX ORDER — ERYTHROMYCIN 5 MG/G
OINTMENT OPHTHALMIC
Qty: 3.5 G | Refills: 1 | Status: SHIPPED | OUTPATIENT
Start: 2024-05-01

## 2024-05-01 RX ORDER — OXYCODONE HYDROCHLORIDE 5 MG/1
10 TABLET ORAL
Status: DISCONTINUED | OUTPATIENT
Start: 2024-05-01 | End: 2024-05-02 | Stop reason: HOSPADM

## 2024-05-01 RX ORDER — ERYTHROMYCIN 5 MG/G
OINTMENT OPHTHALMIC PRN
Status: DISCONTINUED | OUTPATIENT
Start: 2024-05-01 | End: 2024-05-01 | Stop reason: HOSPADM

## 2024-05-01 RX ORDER — PROPOFOL 10 MG/ML
INJECTION, EMULSION INTRAVENOUS PRN
Status: DISCONTINUED | OUTPATIENT
Start: 2024-05-01 | End: 2024-05-01

## 2024-05-01 RX ORDER — NALOXONE HYDROCHLORIDE 0.4 MG/ML
0.1 INJECTION, SOLUTION INTRAMUSCULAR; INTRAVENOUS; SUBCUTANEOUS
Status: DISCONTINUED | OUTPATIENT
Start: 2024-05-01 | End: 2024-05-02 | Stop reason: HOSPADM

## 2024-05-01 RX ORDER — HYDROMORPHONE HYDROCHLORIDE 1 MG/ML
0.4 INJECTION, SOLUTION INTRAMUSCULAR; INTRAVENOUS; SUBCUTANEOUS EVERY 5 MIN PRN
Status: DISCONTINUED | OUTPATIENT
Start: 2024-05-01 | End: 2024-05-02 | Stop reason: HOSPADM

## 2024-05-01 RX ORDER — FENTANYL CITRATE 50 UG/ML
50 INJECTION, SOLUTION INTRAMUSCULAR; INTRAVENOUS EVERY 5 MIN PRN
Status: DISCONTINUED | OUTPATIENT
Start: 2024-05-01 | End: 2024-05-02 | Stop reason: HOSPADM

## 2024-05-01 RX ORDER — LIDOCAINE HYDROCHLORIDE 20 MG/ML
INJECTION, SOLUTION INFILTRATION; PERINEURAL PRN
Status: DISCONTINUED | OUTPATIENT
Start: 2024-05-01 | End: 2024-05-01

## 2024-05-01 RX ORDER — OXYCODONE HYDROCHLORIDE 5 MG/1
5 TABLET ORAL
Status: DISCONTINUED | OUTPATIENT
Start: 2024-05-01 | End: 2024-05-02 | Stop reason: HOSPADM

## 2024-05-01 RX ORDER — LIDOCAINE HYDROCHLORIDE AND EPINEPHRINE 10; 10 MG/ML; UG/ML
INJECTION, SOLUTION INFILTRATION; PERINEURAL PRN
Status: DISCONTINUED | OUTPATIENT
Start: 2024-05-01 | End: 2024-05-01 | Stop reason: HOSPADM

## 2024-05-01 RX ORDER — ONDANSETRON 2 MG/ML
INJECTION INTRAMUSCULAR; INTRAVENOUS PRN
Status: DISCONTINUED | OUTPATIENT
Start: 2024-05-01 | End: 2024-05-01

## 2024-05-01 RX ORDER — FENTANYL CITRATE 50 UG/ML
INJECTION, SOLUTION INTRAMUSCULAR; INTRAVENOUS PRN
Status: DISCONTINUED | OUTPATIENT
Start: 2024-05-01 | End: 2024-05-01

## 2024-05-01 RX ORDER — LIDOCAINE 40 MG/G
CREAM TOPICAL
Status: DISCONTINUED | OUTPATIENT
Start: 2024-05-01 | End: 2024-05-02 | Stop reason: HOSPADM

## 2024-05-01 RX ORDER — TETRACAINE HYDROCHLORIDE 5 MG/ML
SOLUTION OPHTHALMIC PRN
Status: DISCONTINUED | OUTPATIENT
Start: 2024-05-01 | End: 2024-05-01 | Stop reason: HOSPADM

## 2024-05-01 RX ORDER — HYDROMORPHONE HYDROCHLORIDE 1 MG/ML
0.2 INJECTION, SOLUTION INTRAMUSCULAR; INTRAVENOUS; SUBCUTANEOUS EVERY 5 MIN PRN
Status: DISCONTINUED | OUTPATIENT
Start: 2024-05-01 | End: 2024-05-02 | Stop reason: HOSPADM

## 2024-05-01 RX ADMIN — PROPOFOL 20 MG: 10 INJECTION, EMULSION INTRAVENOUS at 11:45

## 2024-05-01 RX ADMIN — FENTANYL CITRATE 25 MCG: 50 INJECTION, SOLUTION INTRAMUSCULAR; INTRAVENOUS at 11:41

## 2024-05-01 RX ADMIN — FENTANYL CITRATE 25 MCG: 50 INJECTION, SOLUTION INTRAMUSCULAR; INTRAVENOUS at 12:07

## 2024-05-01 RX ADMIN — PROPOFOL 50 MG: 10 INJECTION, EMULSION INTRAVENOUS at 11:41

## 2024-05-01 RX ADMIN — SODIUM CHLORIDE, SODIUM LACTATE, POTASSIUM CHLORIDE, CALCIUM CHLORIDE: 600; 310; 30; 20 INJECTION, SOLUTION INTRAVENOUS at 11:37

## 2024-05-01 RX ADMIN — FENTANYL CITRATE 50 MCG: 50 INJECTION, SOLUTION INTRAMUSCULAR; INTRAVENOUS at 12:15

## 2024-05-01 RX ADMIN — ONDANSETRON 4 MG: 2 INJECTION INTRAMUSCULAR; INTRAVENOUS at 11:51

## 2024-05-01 RX ADMIN — LIDOCAINE HYDROCHLORIDE 25 MG: 20 INJECTION, SOLUTION INFILTRATION; PERINEURAL at 11:39

## 2024-05-01 RX ADMIN — PROPOFOL 10 MG: 10 INJECTION, EMULSION INTRAVENOUS at 12:15

## 2024-05-01 RX ADMIN — ACETAMINOPHEN 975 MG: 325 TABLET ORAL at 10:29

## 2024-05-01 NOTE — OP NOTE
PREOPERATIVE DIAGNOSIS: Ptosis, bilateral upper lid.   POSTOPERATIVE DIAGNOSIS:  Ptosis,bilateral upper lid.   PROCEDURE:Bilateral  upper eyelid  ptosis repair by external levator resection.   SURGEON: Lior Sarmiento MD   ASSISTANT: Lilian Baptiste MD, MAYELIN and Solomon Wong MD   ANESTHESIA: Monitored with local infiltration of a 50/50 mixture of 2% lidocaine with epinephrine and 0.5% Marcaine.   COMPLICATIONS: None.   ESTIMATED BLOOD LOSS: Less than 5 mL.   HISTORY: Gregory Parnell  presented with ptosis of bilateral upper lid interfering with the superior visual field and activities of daily living. After the risks, benefits and alternatives to the proposed procedure were explained, informed consent was obtained.   DESCRIPTION OF PROCEDURE: Gregory Panrell  was brought to the operating room and placed supine on the operating table. Intravenous sedation was given. The bilateral upper lid crease was marked with a marking pen and infiltrated with local anesthetic. The area was prepped and draped in the typical sterile ophthalmic fashion. Attention was directed to the right  side. A lid crease incision was made with a 15 blade and dissection carried down to the orbicularis with high temperature cautery. The orbital septum was opened horizontally. The levator aponeurosis was identified and dissected from the superior tarsal border and the underlying Tellez's muscle and advanced with a 5-0 Mersilene suture to bring the lid into a normal height and contour. The suture was passed to partial thickness through the superior tarsal plate and then each end brought underneath the levator aponeurosis. The patient was asked to open the eye and the suture adjusted for height and contour. The inferior orbicularis was secured to the levator aponeurosis with 6-0 Vicryl sutures. The skin was closed with with running 6-0 plain gut sutures.  Ophthalmic ointment was applied to the incision. Attention was directed to the left side  where the same procedure was performed. The patient tolerated the procedure well and left the operating room in stable condition.     SALEEM HEATON MD

## 2024-05-01 NOTE — ANESTHESIA CARE TRANSFER NOTE
Patient: Gregory Parnell    Procedure: Procedure(s):  REPAIR, PTOSIS, BILATERAL       Diagnosis: Ptosis of both eyelids [H02.403]  Diagnosis Additional Information: No value filed.    Anesthesia Type:   MAC     Note:    Oropharynx: oropharynx clear of all foreign objects and spontaneously breathing  Level of Consciousness: awake  Oxygen Supplementation: room air    Independent Airway: airway patency satisfactory and stable  Dentition: dentition unchanged  Vital Signs Stable: post-procedure vital signs reviewed and stable  Report to RN Given: handoff report given  Patient transferred to: Phase II    Handoff Report: Identifed the Patient, Identified the Reponsible Provider, Reviewed the pertinent medical history, Discussed the surgical course, Reviewed Intra-OP anesthesia mangement and issues during anesthesia, Set expectations for post-procedure period and Allowed opportunity for questions and acknowledgement of understanding      Vitals:  Vitals Value Taken Time   BP     Temp     Pulse     Resp     SpO2         Electronically Signed By: FABIO Quinones CRNA  May 1, 2024  12:30 PM

## 2024-05-01 NOTE — DISCHARGE INSTRUCTIONS
Post-operative Instructions    Ophthalmic Plastic and Reconstructive Surgery  Lior Sarmiento M.D.  Lilian Baptiste M.D.    All instructions apply to the operated eye(s) or eyelid(s)      What to expect after surgery:  There will be some swelling, bruising, and likely a black eye (even into the lower eyelids and cheeks). Also expect crusting and discharge from the eye and/or incisions.   A small amount of surface bleeding is normal for the first 48 hours after surgery.  You may notice some bloody tears for the first few days after surgery. This is normal.  Your eye(s) and eyelid(s) may be painful and tender. This is normal after surgery. Use the pain medication as prescribed. If your pain does not improve despite the medication, contact the office.    Wound care and personal care:  Apply ice compresses 15 minutes on 15 minutes off while awake for the first 2 days after surgery, then switch to warm compresses 4 times a day until seen by your physician.   For warm packs you can place a cup of dry uncooked rice in a clean cotton sock. Place sock in microwave 30 seconds to one minute. Next place the warm sock into a plastic bag and wrap the bag with clean warm wet washcloth and place over operated eye.    You may shower or wash your hair the day after surgery. Do not bathe or go swimming for 1 week to prevent contamination of your wounds.  Do not apply make-up to the eyes or eyelids for 2 weeks after surgery.    Activity restrictions and driving:  Avoid heavy lifting, bending, exercise or strenuous activity for 1 week after surgery.  You may resume other activities and return to work as tolerated.  You may not resume driving until have you stopped using narcotic pain medications(such as Norco, Percocet, Tylenol #3).    Medications:  Restart all your regular home medications and eye drops today. If you take Plavix or Aspirin on a regular basis, wait for 3 days after your surgery before restarting these in order to  decrease the risk of bleeding complications.  Avoid aspirin and aspirin-like medications (Motrin, Aleve, Ibuprofen, [place] etc) for 5 days to reduce the risk of bleeding. You may take Tylenol (acetaminophen) for pain.  In addition to your home medications, take the following post-operative medications as prescribed by your physician:  Apply antibiotic ointment (erythromycin) to all sutures three times a day, and into the operated eye(s) at night.  Take scheduled extra strength Tylenol for pain.  You may take 1 to 2 pain pills (norco or oxycodone as prescribed) as needed for breakthrough pain up to every 6 hours.  The pain pills may make you drowsy. You must not drive a car, operate heavy machinery or drink alcohol while taking them.  The pain pills may cause constipation and nausea. Take them with some food to prevent a stomach upset. If you continue to experience nausea, call your physician.    WARNING: All the prescription pain medications listed above contain Tylenol (acetaminophen). You must not take more than 4,000 mg of acetaminophen per 24-hour period. This is equivalent to 6 tablets of Darvocet, 8 tablets of Vicodin, or 12 tablets of Norco, Percocet or Tylenol #3. If you take other over-the-counter medications containing acetaminophen, you must take the amount of acetaminophen into account and reduce the number of prescribed pain pills accordingly.    Contact information and follow-up:  Return to the [facility] Clinic for a follow-up appointment with your physician as  scheduled. If no appointment has been scheduled, call [phone] for an  appointment with Dr. Sarmiento within 1 to 2 weeks from your date of surgery.  -     Please email a few photos of your eye(s) or other operative site(s) to [email or web address] prior to your follow up visit.    For severe pain, bleeding, or loss of vision, call the Eye Clinic at [phone].  After hours or on weekends and holidays, call [phone] and ask to speak  with the ophthalmologist on call.    [facility]  Home Care Following Anesthesia  For 24 hours after surgery:  Get plenty of rest.  A responsible adult must stay with you for at least 24 hours after you leave the surgery center.  Do not drive or use heavy equipment.  If you have weakness or tingling, don't drive or use heavy equipment until this feeling goes away.   Do not drink alcohol.   Avoid strenuous or risky activities.  Ask for help when climbing stairs.  You may feel lightheaded.  IF so, sit for a few minutes before standing.  Have someone help you get up.   If you have nausea (feel sick to your stomach): Drink only clear liquids such as apple juice, ginger ale, broth or 7-Up.  Rest may also help.  Be sure to drink enough fluids.  Move to a regular diet as you feel able.   You may have a slight fever.  Call the doctor if your fever is over 100 F (37.7 C) (taken under the tongue) or lasts longer than 24 hours.  You may have a dry mouth, a sore throat, muscle aches or trouble sleeping. These should go away after 24 hours.  Do not make important or legal decisions.   It is recommended to avoid smoking.               Tips for taking pain medications  To get the best pain relief possible, remember these points:  Take pain medications as directed, before pain becomes severe.  Pain medication can upset your stomach: taking it with food may help.  Constipation is a common side effect of pain medication. Drink plenty of  fluids.  Eat foods high in fiber. Take a stool softener if recommended by your doctor or pharmacist.  Do not drink alcohol, drive or operate machinery while taking pain medications.  Ask about other ways to control pain, such as with heat, ice or relaxation.    Tylenol/Acetaminophen Consumption    If you feel your pain relief is insufficient, you may take Tylenol/Acetaminophen in addition to your narcotic pain medication.   Be careful not to exceed 4,000 mg of  Tylenol/Acetaminophen in a 24 hour period from all sources.  If you are taking extra strength Tylenol/acetaminophen (500 mg), the maximum dose is 8 tablets in 24 hours.  If you are taking regular strength acetaminophen (325 mg), the maximum dose is 12 tablets in 24 hours.    Call a doctor for any of the following:  Signs of infection (fever, growing tenderness at the surgery site, a large amount of drainage or bleeding, severe pain, foul-smelling drainage, redness, swelling).  It has been over 8 to 10 hours since surgery and you are still not able to urinate (pass water).  Headache for over 24 hours.  Numbness, tingling or weakness the day after surgery (if you had spinal anesthesia).  Signs of Covid-19 infection (temperature over 100 degrees, shortness of breath, cough, loss of taste/smell, generalized body aches, persistent headache, chills, sore throat, nausea/vomiting/diarrhea)  Your doctor is:  Dr. Lior Sarmiento, Ophthalmology: [phone]                    Or dial [phone] and ask for the resident on call for:  Ophthalmology  For emergency care, call the:  [place] [facility]:  [phone] (TTY for hearing impaired: [phone])

## 2024-05-13 ENCOUNTER — VIRTUAL VISIT (OUTPATIENT)
Dept: OPHTHALMOLOGY | Facility: CLINIC | Age: 84
End: 2024-05-13
Payer: MEDICARE

## 2024-05-13 DIAGNOSIS — Z98.890 POSTOPERATIVE EYE STATE: Primary | ICD-10-CM

## 2024-05-13 PROCEDURE — 99024 POSTOP FOLLOW-UP VISIT: CPT | Mod: 93 | Performed by: OPHTHALMOLOGY

## 2024-05-13 NOTE — PROGRESS NOTES
Gregory Parnell is a 83 year old male who is being evaluated via a billable telephone visit.      Chief Complaint:   Post-op visit    Subjective:   Swelling and bruising resolving. No new vision changes, eye pain, discharge. Per patient, incisions have healed appropriately and there is no tenderness to palpation.  PROCEDURE:Bilateral  upper eyelid  ptosis repair by external levator resection (5/1/24)    Review of Systems   Constitutional, HEENT, cardiovascular, pulmonary, gi and gu systems are negative, except as otherwise noted.    Objective:  Vitals:  No vitals were obtained today due to virtual visit.     Physical Exam:   healthy, alert and no distress  PSYCH: Alert and oriented times 3; coherent speech, normal   rate and volume, able to articulate logical thoughts, able   to abstract reason, no tangential thoughts, no hallucinations   or delusions  Her affect is normal  RESP: No cough, no audible wheezing, able to talk in full sentences  Remainder of exam unable to be completed due to telephone visits     No photos sent in for review.    Assessment & Plan  1. Postoperative eye state       Gregory Parnell is 2 weeks status post bilateral upper eyelid ptosis repair by external levator resection (5/1/24)  Subjectively healing well (no photos sent for review) and eyes are comfortable.    I have recommended:  * Continue antibiotic ointment or bland lubricating ointment (eg vaseline or aquaphor) to the incision site BID.  * Massage along the incision BID.  * Warm soaks QID until all edema and ecchymoses resolve     Disposition: Telephone follow up in 2 months (given distance to clinic)    5 minutes spent on the phone.  10 minutes spent by me on the date of the encounter doing patient visit      Lilian Baptiste MD  Oculoplastic Surgery Fellow, [facility]    Attending Physician Attestation:  I did not speak with the patient, but I reviewed the case with the resident or fellow and edited the care plan as  necessary.   -Lior Sarmiento MD

## 2024-05-14 ENCOUNTER — TELEPHONE (OUTPATIENT)
Dept: OPHTHALMOLOGY | Facility: CLINIC | Age: 84
End: 2024-05-14
Payer: MEDICARE

## 2024-05-14 NOTE — TELEPHONE ENCOUNTER
Patient confirmed scheduled appointment:  Date: 7/15/24  Time: 8:30am  Visit type: TELEPHONE VISIT RETURN  Provider:   Location: [facility] Location  Testing/imaging: Telephone Visit follow up from Repair Ptosis bilateral  surgery  Additional notes: TELEPHONE VISIT - Return in about 2 months (around 7/13/2024) for Follow Up; Telephone Visit.  Call # [phone] sending photos - DOS 5/1/24 Repair Ptosis bilateral  -Appt Per PT    Sent Reminder letter with email address for patient to send photos to confirmed address.-Per Patient

## (undated) DEVICE — ESU HIGH TEMP LOOP TIP AA03

## (undated) DEVICE — GLOVE BIOGEL PI MICRO SZ 7.5 48575

## (undated) DEVICE — LINEN TOWEL PACK X5 5464

## (undated) DEVICE — SOL WATER IRRIG 500ML BOTTLE 2F7113

## (undated) DEVICE — PACK MINOR EYE CUSTOM ASC

## (undated) DEVICE — EYE PREP BETADINE 5% SOLUTION 30ML 0065-0411-30

## (undated) RX ORDER — FENTANYL CITRATE 50 UG/ML
INJECTION, SOLUTION INTRAMUSCULAR; INTRAVENOUS
Status: DISPENSED
Start: 2024-05-01

## (undated) RX ORDER — ESMOLOL HYDROCHLORIDE 10 MG/ML
INJECTION INTRAVENOUS
Status: DISPENSED
Start: 2024-05-01

## (undated) RX ORDER — ONDANSETRON 2 MG/ML
INJECTION INTRAMUSCULAR; INTRAVENOUS
Status: DISPENSED
Start: 2024-05-01

## (undated) RX ORDER — TRANEXAMIC ACID 100 MG/ML
INJECTION, SOLUTION INTRAVENOUS
Status: DISPENSED
Start: 2024-05-01

## (undated) RX ORDER — ACETAMINOPHEN 325 MG/1
TABLET ORAL
Status: DISPENSED
Start: 2024-05-01